# Patient Record
Sex: MALE | Race: WHITE
[De-identification: names, ages, dates, MRNs, and addresses within clinical notes are randomized per-mention and may not be internally consistent; named-entity substitution may affect disease eponyms.]

---

## 2019-03-14 ENCOUNTER — HOSPITAL ENCOUNTER (OUTPATIENT)
Dept: HOSPITAL 39 - LAB.O | Age: 44
End: 2019-03-14
Attending: FAMILY MEDICINE
Payer: COMMERCIAL

## 2019-03-14 DIAGNOSIS — I10: ICD-10-CM

## 2019-03-14 DIAGNOSIS — Z00.00: Primary | ICD-10-CM

## 2019-03-14 DIAGNOSIS — E78.5: ICD-10-CM

## 2019-10-08 NOTE — RAD
EXAM:  XR Right Foot Complete, 3 or More Views



CLINICAL HISTORY:  FOOT PN



TECHNIQUE:  Frontal, lateral and oblique views of the right foot.



COMPARISON:  No relevant prior studies available.



FINDINGS:

  Limitations:  None.

  Bones/joints:  Unremarkable.  No acute fracture.  No

dislocation.

  Soft tissues:  Unremarkable.  

  Other findings:  Intact lateral fibular plate and screws.



IMPRESSION:     

  No acute findings in the right foot.



Electronically signed by:  Yara Lawson MD  10/8/2019 4:31 PM

CDT Workstation: [id number]

## 2020-07-17 NOTE — RAD
EXAM DESCRIPTION: Chest x-ray,1 View



CLINICAL HISTORY: 45 years Male, s/p EGD, epigastric and flank

pain



COMPARISON: Previous chest x-ray May 10, 2009



TECHNIQUE: AP portable chest.



FINDINGS:



Heart size is normal with normal pulmonary vascularity.



Linear discoid atelectasis in left upper lobe with airspace

infiltrate in the lingula and/or left lower lobe consistent with

pneumonia. This is a new finding compared to the previous study.



No pulmonary mass or worrisome nodule.



No pneumothorax or pleural effusion.



Bones are unremarkable.



IMPRESSION:



Patchy infiltrates in the left mid and lower lung zones.



Electronically signed by:  Merlin Garza MD  7/17/2020 2:30

PM CDT Workstation: [id number]

## 2020-07-17 NOTE — HP
SUPERVISING PHYSICIAN:  Jhoan Parmar M.D.



CHIEF COMPLAINT:  Left flank pain.



HISTORY OF PRESENT ILLNESS:  Mr. Johnson is a 45 year-old  male patient 
that has a past medical history of hypertension and gastroesophageal reflux 
disease.  He was actually sent from PACU after he had just had an 
esophagogastroduodenoscopy done complaining of some epigastric and left flank 
pain.  It was reported during the procedure that the patient had some equal of 
gastritis with a biopsy taken without any complications.  After the procedure 
was completed he had the onset of symptoms of gastric pain and left flank pain 
which apparently were not present prior to the procedure.  Initially in the 
Emergency Room he was complaining of pain rating a constant 6/10 radiating up 
into his chest and his back.  There were concerns initially that he may have 
aspirated during the EGD, however the patient endorses he has been having severe
GERD symptoms and waking up in the middle of the night coughing and choking, and
actually throwing up.  His labs initially showed a white count of 5,800 without 
a left shift, but he was showing a fever on admission at 101.  He had a CT of 
the chest in the Emergency Room that showed bilateral pulmonary infiltrates most
significant in the left lower lobe compatible with pneumonia versus aspiration 
and/or atelectasis.  He has been tested for COVID-19 and was positive prior to 
the procedure.  Given his symptomology, other labs were completed, including 
amylase and lipase which did show a slight elevation of amylase at 306 with 
normal lipase.  He does have a history of diabetes.  His glucose was 154.  Liver
functions show just a slightly elevated AST at 57, troponin was less than 0.02. 
EKG in the Emergency Room showed sinus rhythm.  No ST elevation or Q waves or T 
wave inversions.  He was diagnosed with aspiration pneumonitis and some mild 
hypoxia.  On initial presentation, vital signs showed he was satting in PACU and
in the Emergency Room in the high 80s to low 90s on nasal cannula.  He was also 
tested for Strep and was found to be Group A Strep positive.  He was started on 
antibiotics initially with clindamycin and Rocephin, and has been placed in 
Observation for further evaluation and close monitoring.



PAST MEDICAL HISTORY: 

1.   Gastroesophageal reflux disease.

2.   Hypertension.

3.   Diabetes mellitus.

4.   Anxiety.

5.   Obstructive sleep apnea which will be placed on CPAP.



PAST SURGICAL HISTORY:  Ady placed in the right leg.



HOME MEDICATIONS:

1.   Hydrochlorothiazide 25 mg daily.

2.   Flexeril 10 mg p.r.n.

3.   Citalopram 40 mg daily.

4.   Lipitor 10 mg daily.

5.   Metformin extended release 1,000 mg daily.

6.   Scopolamine 1 mg every other day.

7.   Pantoprazole 40 mg daily.

8.   Meloxicam 15 mg daily.

9.   Losartan daily 50 mg.



ALLERGIES:  NO KNOWN DRUG ALLERGIES.



FAMILY HISTORY:  Noncontributory to current admission.



SOCIAL HISTORY:  The patient lives in [place].  He is .  He does drink 
alcohol on a rare occasion and is a nonsmoker.  Does not use any illicit drugs.



REVIEW OF SYSTEMS:  

CONSTITUTIONAL:  Positive for some general malaise and fever, sore throat.

HEENT:  Positive for sore throat.  Denies any headaches, vision changes or ear 
aches.

RESPIRATORY:  Some mild shortness of breath but denies any wheezing, but does 
have some history of coughing at night, especially with GERD.

CHEST:  As noted in history of present illness.

ABDOMEN:  Left flank pain.  Denies any abdominal pains, nausea, vomiting, 
diarrhea or constipation.

GENITOURINARY:  Denies any dysuria, hematuria, polyuria.  

MUSCULOSKELETAL:  Denies any arthralgias, joint swelling.

SKIN:  Denies any lesions, rashes or unexplained changes.

NEUROLOGIC:  Denies any headaches, vision changes, ataxia or seizures.

HEMATOLOGIC:  Denies any unexplained bleeding, bruising or any transfusion 
reactions.



PHYSICAL EXAMINATION: 



VITAL SIGNS:  In the Emergency Room, he had a temperature of 101, heart rate 
105, blood pressure 132/88, satting 86 on room air, 92% on 2 liters nasal 
cannula.  Blood pressure 132/88, respirations 22 to 26.



GENERAL:  The patient did not appear to be in any acute distress.  He is alert, 
resting comfortably.



HEENT: Tympanic membranes clear bilaterally.  Oropharynx is pink with just 
mildly erythematous posterior pharynx.  Tonsils look to be within normal size 
with no noted exudate.



NECK: Supple, nontender with full range of motion.  No jugular venous distention
noted.  



CHEST:  Lung sounds were diminished with some mild crackles heard on the left 
compared to the right but no obvious wheezing or rales.



CARDIOVASCULAR:  Regular rate and rhythm without any appreciable murmurs, 
gallops, or rubs.  



ABDOMEN: Soft, nontender.  Positive bowel sounds.  It was nondistended.



BACK:  He does have some flank tenderness noted to palpation of the lower back 
area to the left rib cage.

 

EXTREMITIES:  Without any edema, clubbing or cyanosis.



NEUROLOGIC: He is alert and oriented times three.  Cranial nerves II-XII are 
grossly intact.  Facial features are symmetrical.  Extraocular movements are 
within normal limits. There is no nystagmus noted.  



SKIN:  Warm, pink and dry.



RADIOLOGY:  Initial chest x-ray in the Emergency Room showed patchy infiltrates 
in the left mid and lower lung zone.  This was followed-up with both a CT of the
abdomen and chest and pelvis with contrast per radiology interpretation on the 
CT of the chest was noted bilateral pulmonary infiltrates most significant in 
the left lung, predominantly left lower lobe compatible with pneumonia versus 
aspiration.  Please see that full report for details.  CT of the abdomen and 
pelvis per radiology interpretation again showed the left lower lobe mostly 
alveolar infiltrate suggestive of pneumonia versus aspiration.  There was note 
of colonic  diverticulosis without any CT evidence of diverticulitis and there 
was some groundglass attenuation in the root of the mesentery with borderline 
enlarged lymph nodes which could represent mesenteric panniculitis.  There are 
no enlarged lymph nodes seen in the abdomen or pelvis.  Some noted hepatomegaly 
and diffuse fatty infiltrate or the liver.  Please see those full reports for 
details.



ASSESSMENT: 

1.   Aspiration pneumonitis with developing pneumonia status post 
esophagogastroduodenoscopy.

2.   Gastroesophageal reflux disease status post esophagogastroduodenoscopy with
finding

      of mild gastritis and duodenitis.

3.   Elevated amylase with left sided pain with the pancreas noted to be 
unremarkable on CT.

      Etiology uncertain, possibly just some mild pancreatitis prior to 
hospitalization, resolving.

4.   Diabetes mellitus on oral therapy.

5.   History of anxiety.

6.   Obstructive sleep apnea utilizing CPAP.



PLAN:  Mr. Johnson is going to be placed in observation, just considering his 
symptomology post procedural EGD with concerns for aspiration pneumonitis versus
developing pneumonia.  He was started on Rocephin and clindamycin on initial 
admission.  Made NPO and then advance to clear liquids in the morning.  Follow 
his labs in the morning.  Reassess with anticipation of length of stay being 1 
to 2 days, possibly discharging later tomorrow on the 18th.  Until we can 
transition to outpatient management, he will be on insulin sliding scale per 
protocol.  He is already on ulcer prophylaxis with Protonix.  Will continue to 
monitor and treat as needed.



#[id number]

[facility]

## 2020-07-17 NOTE — CT
EXAM DESCRIPTION: 

Chest w/Contrast



CLINICAL HISTORY: 

postoperative epigastric pain and Left flank pain



COMPARISON: 

None.



TECHNIQUE: 

Postcontrast CT images of the chest are obtained using standard

imaging protocol. This exam was performed according to our

departmental dose-optimization program, which includes automated

exposure control, adjustment of the mA and/or kV according to

patient size and/or use of iterative reconstruction technique .



FINDINGS: 

Heart is mildly enlarged. No coronary artery calcifications.

Thoracic aorta unremarkable.

No pathologically enlarged mediastinal, hilar, or axillary

lymphadenopathy seen.



No pleural or pericardial effusion.



Images are moderately degraded by breathing motion artifact.



Lungs are mildly hypoaerated. Patchy areas of groundglass opacity

are seen in the lungs bilaterally most pronounced in the left

lung. Alveolar airspace densities with mild interstitial

thickening is seen peripherally throughout the left lower lobe.

Mild areas of interstitial thickening in the mid inferior,

posterior aspect of the left upper lobe.



Osseous structures show no aggressive bony lesions. Mild

spondylitic changes of the spine are seen.



IMPRESSION: 

Bilateral pulmonary infiltrates most significant in the left lung

and predominantly left lower lobe compatible with pneumonia

versus aspiration and/or atelectasis.

Imaging features can be seen with COVID-19 pneumonia, though are

nonspecific and can occur with a variety of infectious and

noninfectious processes.



No pleural effusion or pneumothorax.



Findings in the upper abdomen are noted on CT of the abdomen from

today.





Electronically signed by:  Eleuterio Graves MD  7/17/2020 4:03 PM CDT

Workstation: [id number]

## 2020-07-17 NOTE — CT
EXAM DESCRIPTION: 

Abdomen/Pelvis w/Contrast



CLINICAL HISTORY: 

postoperative epigastric pain and Left flank pain



COMPARISON: 

None.



TECHNIQUE: 

Postcontrast CT images of the abdomen and pelvis are obtained

using standard imaging protocol. This exam was performed

according to our departmental dose-optimization program, which

includes automated exposure control, adjustment of the mA and/or

kV according to patient size and/or use of iterative

reconstruction technique .



FINDINGS: 

Visualized lung bases show multiple alveolar densities and mild

interstitial thickening in the left lower lobe.



Liver is enlarged measuring 23.1 cm with heterogeneous decreased

attenuation. No abnormal enhancing mass.

Spleen, pancreas, adrenal glands, gallbladder are unremarkable.



Mild scattered calcifications of the arterial vasculature.



No nephrolithiasis. Normal cortical enhancement. No ureteral

calcification or obstruction.

Urinary bladder is poorly distended but unremarkable.

Moderate prostate calcifications.



The appendix is normal.



Stomach is poorly distended but unremarkable. No small bowel

obstruction or bowel wall thickening. Moderate scattered

diverticuli of the descending to sigmoid colon without associated

inflammatory changes or fluid collections.



Mild groundglass attenuation in the mesentery with several

borderline enlarged lymph nodes measuring maximum 14 mm short

axis.



Osseous structures show no aggressive bony lesions. No displaced

rib fractures. Spondylitic changes of the spine are seen.



IMPRESSION: 

Left lower lobe mostly alveolar infiltrate suggests pneumonia

versus aspiration. Recommend follow-up until resolution.



Colon diverticulosis without CT evidence of diverticulitis.



Groundglass attenuation in the root of the mesentery with

borderline enlarged lymph nodes could represent mesenteric

paniculitis.. No other enlarged lymph nodes are seen in the

abdomen or pelvis.



Hepatomegaly and diffuse fatty infiltration of the liver is seen.



Other findings as described in body of report.

 



Electronically signed by:  Eleuterio Graves MD  7/17/2020 3:55 PM CDT

Workstation: [id number]

## 2020-07-17 NOTE — OP
DATE OF PROCEDURE:  07/17/20



PREOPERATIVE DIAGNOSIS: 

1.  Epigastric pain.

2.  Reflux.



POSTOPERATIVE DIAGNOSIS: 

1. Gastritis. 



PROCEDURE: 

1.  EGD with biopsy.



SURGEON:  Jhoan Bennett MD



ANESTHESIA: General.



FINDINGS:  Esophagus appeared normal.  The body of the stomach was normal.  
There was no significant hiatal hernia.  There was some gastritis and 
duodenitis.  There was a small polyp a the pylorus that did not look malignant. 
This was biopsied.  Also, biopsies were taken for H. pylori.



PROCEDURE:  The endoscope was passed without difficulty in the esophagus.  We 
entered to the stomach, insufflated and went to the pylorus and second portion 
of the duodenum.  Upon withdrawal, there appeared to be a small amount of 
duodenitis.  No ulcers were seen.  In the antrum, there was watermelon type 
lines consistent with gastritis and a small inflammatory type polyp at the 
pylorus.  Biopsy of this was taken as well as some tissue and then additional 
biopsy sent for H. pylori.  Two small possible healed ulcers in the lesser curve
of the antrum.  Upon retroflexion, the body and fundus appeared normal.  There 
was no evidence of hiatal hernia.  Upon withdrawal of the scope and desufflation
of the stomach, the GE junction appeared normal.  There was significant 
esophagitis or erosions.  The scope was completed.  He tolerated the procedure 
and was taken to Recovery to be discharged.



#[id number]

MTDD

## 2020-07-17 NOTE — ED.PDOC
History of Present Illness





- General


Chief Complaint: Post Op Problems


Stated Complaint: Heartburn, L flank pain, headache post op


Time Seen by Provider: 07/17/20 13:55


Source: patient





- History of Present Illness


Initial Comments: 





45-year-old male with past medical history of hypertension, GERD who is sent 

over from the PACU for chief complaint of epigastric pain and left flank pain, 

which began just after his EGD procedure prior to arrival here.  Patient reports

that he has been having diarrhea for the past 3 to 4 days, 2-3 episodes per day,

initially was green in color but now more watery in nature.  Thus he was 

scheduled for an EGD for evaluation which she had today just prior to arrival.  

Apparently during the procedure patient was only noted to have some evidence of 

gastritis and some biopsies were taken without complication.  





Reports when he woke up after his procedure he had new onset of symptoms of 

epigastric pain and left flank pain which were not present prior to the 

procedure.  He reports the epigastric pain is constant, 6/10, radiates up his 

chest into the back of his throat, burning, no known exacerbating factors, was 

given Pepcid, Maalox, and Zofran 8 mg IV in the PACU without any relief.  

Reports the left flank pain is sharp, constant, 6/10 severity, radiates down the

left lower back and into the left rib cage as well, worse with palpation and 

deep breathing.  Denies any fevers, chills, coughing, urinary symptoms, leg 

swelling, headache, body aches, nausea, vomiting, constipation.  Does report 

sore throat.  No known recent exposure to COVID-19.





Allergies/Adverse Reactions: 


Allergies





NO KNOWN ALLERGY Allergy (Verified 01/26/14 11:07)


   





Home Medications: 


Ambulatory Orders





Ibuprofen 800 mg PO TID PRN #20 tab 08/01/15 


Atorvastatin Calcium [Lipitor] 10 mg PO DAILY 07/17/20 


Citalopram Hydrobromide 40 mg PO DAILY 07/17/20 


Cyclobenzaprine HCl [Flexeril] 10 mg PO DAILY PRN 07/17/20 


Hydrochlorothiazide 25 mg PO DAILY 07/17/20 


Losartan Potassium 50 mg PO DAILY 07/17/20 


Meloxicam 15 mg PO DAILY 07/17/20 


Pantoprazole Sodium 40 mg PO DAILY 07/17/20 


Scopolamine [Transderm-Scop] 1 mg TD PARRIS-OTH-DAY 07/17/20 


metFORMIN XR [Glucophage XR] 1,000 mg PO DAILY 07/17/20 











Review of Systems





- Review of Systems


Review of Systems: 





07/17/20 14:44


As per HPI





All other Systems: Reviewed and Negative





Past Medical History (General)





- Patient Medical History


Hx Seizures: No


Hx Stroke: No


Hx Dementia: No


Hx Asthma: No


Hx of COPD: No


Hx Cardiac Disorders: No


Hx Congestive Heart Failure: No


Hx Pacemaker: No


Hx Hypertension: No


Hx Thyroid Disease: No


Hx Diabetes: Yes - GLUCOSE 118


Hx Gastroesophageal Reflux: Yes


Hx Renal Disease: No


Hx Cancer: No


Hx of HIV: No


Hx Hepatitis C: No


Hx MRSA: No





- Vaccination History


Hx Tetanus, Diphtheria Vaccination: Yes


Hx Influenza Vaccination: No


Hx Pneumococcal Vaccination: No





- Social History


Hx Tobacco Use: Yes


Hx Chewing Tobacco Use: No


Hx Alcohol Use: Yes


Hx Substance Use: No


Hx Substance Use Treatment: No


Hx Depression: No


Hx Physical Abuse: No


Hx Emotional Abuse: No


Hx Suspected Abuse: No





- Female History


Patient Pregnant: No





Family Medical History





- Family History


  ** Mother


Family History: Unknown





Physical Exam





- Physical Exam


General Appearance: Alert, No apparent distress


Eye Exam: bilateral normal


Ears, Nose, Throat: hearing grossly normal, normal ENT inspection, normal 

pharynx


Neck: non-tender, full range of motion, supple, normal inspection


Respiratory: no respiratory distress, no accessory muscle use, other - 

Diminished air movement throughout with bibasilar crackles, worse on the left 

side, no noted wheezing or rales


Cardiovascular/Chest: normal peripheral pulses, regular rate, rhythm, no edema, 

no gallop, no JVD, no murmur


Peripheral Pulses: radial,right: 2+, radial,left: 2+


Gastrointestinal/Abdominal: normal bowel sounds, non tender, soft, no 

organomegaly


Back Exam: normal inspection, other - Back appears normal on inspection.  There 

is moderate left flank tenderness to palpation in left lower back tenderness to 

palpation


Extremity: normal range of motion, non-tender, normal inspection, no pedal 

edema, no calf tenderness, normal capillary refill, pelvis stable


Neurologic: CNs II-XII nml as tested, no motor/sensory deficits, alert, normal 

mood/affect, oriented x 3


Skin Exam: normal color, warm/dry





Progress





- Progress


Progress: 





07/17/20 14:46


Postoperative epigastric pain and left flank pain


-Also with new onset acute hypoxia, resolved with nasal cannula oxygen at 2 L


-Consider aspiration, pneumonia, ACS, PE, gastritis, acute pancreatitis, acute 

cholecystitis, UTI, kidney stone, other infectious etiology, COVID-19, strep, 

gastroenteritis/colitis, other


-Obtain cardiac/abdominal work-up, lactate, urinalysis, respiratory panel, strep

test


-1 L normal saline bolus and Protonix 40 mg IV for pain





07/17/20 16:06


-Patient remained stable, feeling a little better


-Labs revealed normal serum WBC 5800 without left shift or bandemia, lactate 

1.8.  Amylase slightly elevated to 306 but lipase is normal at 34.


-CT scans of the chest, abdomen, and pelvis with IV contrast obtained which 

revealed bilateral pulmonary infiltrates, most significant in the left lower 

lobe of the lung suspicious for aspiration versus pneumonia.  Findings also 

could be representative of COVID-19, which often has nonspecific CT findings.  

No other acute processes were noted.


-Suspect aspiration pneumonitis most likely given his presentation without fever

and without leukocytosis.  However, as cannot rule out pneumonia will begin IV 

antibiotics with Rocephin at this time.  His d-dimer was normal so PE is 

excluded.  Troponin level also was negative.  Patient will need to be admitted 

to the hospital given his acute hypoxia, which I suspect is due to his 

pneumonitis.  We are currently awaiting his rapid COVID-19 test to help 

determine disposition.





07/17/20 16:51


-COVID-19 rapid testing is negative.


-I spoke with Ruth Marcus, hospitalist, who accepts the patient to her service 

for aspiration pneumonitis, possible community-acquired pneumonia, with acute 

hypoxia, and acute gastritis.  The plan will be continued supplemental oxygen 

along with cardiac monitoring and IV antibiotics as well as pain control.





Sherwin Murray MD


Billing #[id number]








                                        





07/17/20 14:05


Sodium Chloride 0.9% (Flush) [Saline Flush Syringe]   10 ml IV PRN PRN 





07/17/20 14:15


EKG STAT 





07/17/20 15:00


RESPIRATORY PANEL 2 Stat 





07/17/20 15:12


Hold Metformin x 48Hrs [id number] 





07/17/20 16:05


cefTRIAXone SODIUM [Rocephin] 1 gm   Sodium Chl 0.9% 50Ml Min-Bag+ [NS 50ml 

MINI-BAG+] 50 ml IVPB ONCE 








                         Laboratory Results - last 24 hr











  07/17/20 07/17/20 07/17/20





  14:10 14:10 14:16


 


WBC    5.8


 


RBC    5.27


 


Hgb    16.2


 


Hct    47.3


 


MCV    89.8


 


MCH    30.8


 


MCHC    34.3


 


RDW    12.7


 


Plt Count    187


 


MPV    8.6


 


Absolute Neuts (auto)    3.90


 


Absolute Lymphs (auto)    1.50


 


Absolute Monos (auto)    0.30


 


Absolute Eos (auto)    0.10


 


Absolute Basos (auto)    0.10


 


Neutrophils %    66.6


 


Lymphocytes %    25.5


 


Monocytes %    5.5


 


Eosinophils %    1.2


 


Basophils %    1.2


 


D-Dimer, Quantitative   292.0 


 


Sodium   


 


Potassium   


 


Chloride   


 


Carbon Dioxide   


 


Anion Gap   


 


BUN   


 


Creatinine   


 


BUN/Creatinine Ratio   


 


Random Glucose   


 


Serum Osmolality   


 


Lactic Acid   


 


Calcium   


 


Total Bilirubin   


 


Direct Bilirubin   


 


Indirect Bilirubin   


 


AST   


 


ALT   


 


Alkaline Phosphatase   


 


Troponin I  < 0.02  


 


Serum Total Protein   


 


Albumin   


 


Amylase   


 


Lipase   


 


Urine Color   


 


Urine Appearance   


 


Urine pH   


 


Ur Specific Gravity   


 


Urine Protein   


 


Urine Glucose (UA)   


 


Urine Ketones   


 


Urine Blood   


 


Urine Nitrite   


 


Urine Bilirubin   


 


Urine Urobilinogen   


 


Ur Leukocyte Esterase   


 


Urine RBC   


 


Urine WBC   


 


Ur Epithelial Cells   


 


Ur Transition Epith Cell   


 


Amorphous Sediment   


 


Urine Bacteria   


 


Urine Mucus   


 


Group A Strep Rapid   














  07/17/20 07/17/20 07/17/20





  14:16 14:16 15:00


 


WBC   


 


RBC   


 


Hgb   


 


Hct   


 


MCV   


 


MCH   


 


MCHC   


 


RDW   


 


Plt Count   


 


MPV   


 


Absolute Neuts (auto)   


 


Absolute Lymphs (auto)   


 


Absolute Monos (auto)   


 


Absolute Eos (auto)   


 


Absolute Basos (auto)   


 


Neutrophils %   


 


Lymphocytes %   


 


Monocytes %   


 


Eosinophils %   


 


Basophils %   


 


D-Dimer, Quantitative   


 


Sodium  139  


 


Potassium  3.9  


 


Chloride  100 L  


 


Carbon Dioxide  29  


 


Anion Gap  13.9  


 


BUN  24 H  


 


Creatinine  1.47 H  


 


BUN/Creatinine Ratio  16.3  


 


Random Glucose  164 H  


 


Serum Osmolality  285.2  


 


Lactic Acid   1.8 


 


Calcium  9.2  


 


Total Bilirubin  0.9  


 


Direct Bilirubin  0.2  


 


Indirect Bilirubin  0.7  


 


AST  57 H  


 


ALT  38  


 


Alkaline Phosphatase  70  


 


Troponin I   


 


Serum Total Protein  7.4  


 


Albumin  4.2  


 


Amylase  306 H*  


 


Lipase  34  


 


Urine Color   


 


Urine Appearance   


 


Urine pH   


 


Ur Specific Gravity   


 


Urine Protein   


 


Urine Glucose (UA)   


 


Urine Ketones   


 


Urine Blood   


 


Urine Nitrite   


 


Urine Bilirubin   


 


Urine Urobilinogen   


 


Ur Leukocyte Esterase   


 


Urine RBC   


 


Urine WBC   


 


Ur Epithelial Cells   


 


Ur Transition Epith Cell   


 


Amorphous Sediment   


 


Urine Bacteria   


 


Urine Mucus   


 


Group A Strep Rapid    Positive














  07/17/20





  15:15


 


WBC 


 


RBC 


 


Hgb 


 


Hct 


 


MCV 


 


MCH 


 


MCHC 


 


RDW 


 


Plt Count 


 


MPV 


 


Absolute Neuts (auto) 


 


Absolute Lymphs (auto) 


 


Absolute Monos (auto) 


 


Absolute Eos (auto) 


 


Absolute Basos (auto) 


 


Neutrophils % 


 


Lymphocytes % 


 


Monocytes % 


 


Eosinophils % 


 


Basophils % 


 


D-Dimer, Quantitative 


 


Sodium 


 


Potassium 


 


Chloride 


 


Carbon Dioxide 


 


Anion Gap 


 


BUN 


 


Creatinine 


 


BUN/Creatinine Ratio 


 


Random Glucose 


 


Serum Osmolality 


 


Lactic Acid 


 


Calcium 


 


Total Bilirubin 


 


Direct Bilirubin 


 


Indirect Bilirubin 


 


AST 


 


ALT 


 


Alkaline Phosphatase 


 


Troponin I 


 


Serum Total Protein 


 


Albumin 


 


Amylase 


 


Lipase 


 


Urine Color  Yellow


 


Urine Appearance  Clear


 


Urine pH  5.5


 


Ur Specific Gravity  1.020


 


Urine Protein  Negative


 


Urine Glucose (UA)  Negative


 


Urine Ketones  Negative


 


Urine Blood  Negative


 


Urine Nitrite  Negative


 


Urine Bilirubin  Negative


 


Urine Urobilinogen  0.2


 


Ur Leukocyte Esterase  Negative


 


Urine RBC  0-1


 


Urine WBC  1-3


 


Ur Epithelial Cells  1-3


 


Ur Transition Epith Cell  0-1


 


Amorphous Sediment  1+


 


Urine Bacteria  Rare


 


Urine Mucus  Trace


 


Group A Strep Rapid 

















- EKG/XRAY/CT


EKG: Sinus - Normal sinus rhythm, heart rate 80, no ST elevations or Q waves 

noted, axis normal, intervals normal, appears largely unchanged from 8/28/2016 

EKG


XRAY: chest - Scattered left-sided infiltrates, worse in the lower lobe, 

concerning for aspiration versus pneumonia per my read





Departure





- Departure


Clinical Impression: 


 Aspiration pneumonitis, Hypoxia





Community acquired pneumonia


Qualifiers:


 Laterality: left Lung location: lower lobe of lung Qualified Code(s): J18.9 - 

Pneumonia, unspecified organism





Gastritis


Qualifiers:


 Gastritis type: unspecified gastritis Chronicity: acute Gastritis bleeding: 

without bleeding Qualified Code(s): K29.00 - Acute gastritis without bleeding





Time of Disposition: 16:50


Disposition: Admit Patient


Condition: Fair


Departure Forms:  ED Discharge - Pt. Copy, Patient Portal Self Enrollment


Referrals: 


Jesus Verdugo MD [Primary Care Provider] - 1-2 Weeks


Home Medications: 


Ambulatory Orders





Ibuprofen 800 mg PO TID PRN #20 tab 08/01/15 


Atorvastatin Calcium [Lipitor] 10 mg PO DAILY 07/17/20 


Citalopram Hydrobromide 40 mg PO DAILY 07/17/20 


Cyclobenzaprine HCl [Flexeril] 10 mg PO DAILY PRN 07/17/20 


Hydrochlorothiazide 25 mg PO DAILY 07/17/20 


Losartan Potassium 50 mg PO DAILY 07/17/20 


Meloxicam 15 mg PO DAILY 07/17/20 


Pantoprazole Sodium 40 mg PO DAILY 07/17/20 


Scopolamine [Transderm-Scop] 1 mg TD PARRIS-OTH-DAY 07/17/20 


metFORMIN XR [Glucophage XR] 1,000 mg PO DAILY 07/17/20 











Decision To Admit





- Decistion To Admit


Decision to Admit Reason: Admit from ER


Decision to Admit Date: 07/17/20


Decision to Admit Time: 16:51

## 2020-07-18 NOTE — RAD
EXAM:Chest,2 Views



CLINICAL INDICATION: Aspiration pneumonia



COMPARISON: 7/17/2020



FINDINGS:Two views of the chest were obtained. The heart size is

normal. The pulmonary vascularity is unremarkable. Streaky

infiltrates are again noted in the left mid to lower lung without

definite change from the previous study. There is no pneumothorax

or pleural effusion.



IMPRESSION: Stable left-sided infiltrates, suspicious for

pneumonia.



Electronically signed by:  Maurice Pinto MD  7/18/2020 8:04 AM CDT

Workstation: [id number]

## 2020-07-19 NOTE — PN
SUPERVISING PHYSICIAN:    Jhoan Parmar MD



DATE:    07/18/20



SUBJECTIVE:  The patient is still having a little but of pain in his left 
ribcage area towards the bottom side.  It is kind of nonspecific, seems like it 
is more of a insidious status pain, a little bit positional at that.  His 
gastroesophageal reflux disease seems to be under control, he has not had 
anymore ingestion, therefore he does show developing pneumonitis, possible 
aspiration, but he is showing to be stable.



OBJECTIVE:

VITAL SIGNS:  Temperature 97.9, pulse 64, blood pressure 120/75, respirations 
15, oxygen saturation 92% on 2 liter nasal cannula.

GENERAL:  The patient is resting comfortably, does not appear to be in any acute
distress.

CHEST:  Lung sounds are actually fairly clear, no obvious wheezing or rales.  He
does have a little bit of crackle continued on the left but nothing on the 
right.

HEART:  Regular rate and rhythm.

ABDOMEN:  Soft, non-tender, positive bowel sounds.

EXTREMITIES:  Without edema.

NEUROLOGIC:   He is alert and oriented x3.



LABORATORY:  Does show a leukocytosis at 11,000 but no current left shift.  
Chemistries show normal electrolytes, BUN 24, creatinine down to 1.44, blood 
sugar remains elevated but controlled between 142 and 157.  Amylase this morning
is down to 212, lipase remains normal.  Liver functions all within normal limits
except for an elevated bilirubin of 1.4.  D-dimer on admission was normal at 292
and again, his group A strep was positive. 



RADIOLOGY:  Chest x-ray this morning per radiology interpretation showed a 
stable left-sided infiltrate suspicious for pneumonia.



ASSESSMENT: 

1.   Aspiration pneumonitis versus pneumonia status post EGD with history 

      of gastroesophageal reflux disease.

2.   Gastroesophageal reflux disease status post EGD again with findings on 

       Dr. Bennett's report for mild gastritis and duodenitis.

3.   Elevated amylase with left sided pain with the pancreas noted to be 
unremarkable on CT.

      Etiology uncertain, possibly just some mild pancreatitis prior to 
hospitalization, resolving.

4.   Diabetes mellitus on oral therapy.

5.   Acute renal insufficiency, likely from recent imaging studies with multiple
studies

      including imaging dye.  Will continue to monitor closely.

5.   History of anxiety.

6.   Obstructive sleep apnea utilizing CPAP.



PLAN:  We will go ahead and continue with antibiotics but given the fact that he
has good kidney function and is not allergic, I am going to go ahead and switch 
him to Unasyn.  We will continue with aggressive pulmonary hygiene.  He said he 
did have a little bit of sputum production which was more greenish in color this
morning.  Again, I would anticipate he will probably go home tomorrow.  We will 
continue to treat and monitor and treat until that point.  



#[id number]

[facility]

## 2020-08-04 NOTE — DS
SUPERVISING  PHYSICIAN:  Jhoan Parmar M.D.



ADMISSION DIAGNOSIS:

1.   Aspiration pneumonitis with developing pneumonia status post 
esophagogastroduodenoscopy.

2.   Gastroesophageal reflux disease status post esophagogastroduodenoscopy with
finding

      of mild gastritis and duodenitis.

3.   Elevated amylase with left sided pain with the pancreas noted to be 
unremarkable on CT.

      Etiology uncertain, possibly just some mild pancreatitis prior to 
hospitalization, resolving.

4.   Diabetes mellitus on oral therapy.

5.   History of anxiety.

6.   Obstructive sleep apnea utilizing CPAP.

7.   Group A Strep pharyngitis.



DISCHARGE DIAGNOSIS: 

1.   Aspiration pneumonitis versus pneumonia status post EGD with history 

      of gastroesophageal reflux disease.

2.   Gastroesophageal reflux disease status post EGD again with findings on 

       Dr. Bennett's report for mild gastritis and duodenitis.

3.   Elevated amylase with left sided pain with the pancreas noted to be 
unremarkable on CT.

      Etiology uncertain, possibly just some mild pancreatitis prior to 
hospitalization, resolving.

4.   Diabetes mellitus on oral therapy.

5.   Acute renal insufficiency, likely from recent imaging studies with multiple
studies

      including imaging dye.  Will continue to monitor closely.

5.   History of anxiety.

6.   Obstructive sleep apnea utilizing CPAP.

7.   Group A Strep pharyngitis.



REASON FOR HOSPITALIZATION:  Mr. Johnson is a 45 year-old  male patient 
that has a past medical history of hypertension and gastroesophageal reflux 
disease.  He was actually sent from PACU after he had just had an 
esophagogastroduodenoscopy done complaining of some epigastric and left flank 
pain.  It was reported during the procedure that the patient had some equal of 
gastritis with a biopsy taken without any complications.  After the procedure 
was completed he had the onset of symptoms of gastric pain and left flank pain 
which apparently were not present prior to the procedure.  Initially in the 
Emergency Room he was complaining of pain rating a constant 6/10 radiating up 
into his chest and his back.  There were concerns initially that he may have 
aspirated during the EGD, however the patient endorses he has been having severe
GERD symptoms and waking up in the middle of the night coughing and choking, and
actually throwing up.  His labs initially showed a white count of 5,800 without 
a left shift, but he was showing a fever on admission at 101.  He had a CT of 
the chest in the Emergency Room that showed bilateral pulmonary infiltrates most
significant in the left lower lobe compatible with pneumonia versus aspiration 
and/or atelectasis.  He has been tested for COVID-19 and was positive prior to 
the procedure.  Given his symptomology, other labs were completed, including 
amylase and lipase which did show a slight elevation of amylase at 306 with 
normal lipase.  He does have a history of diabetes.  His glucose was 154.  Liver
functions show just a slightly elevated AST at 57, troponin was less than 0.02. 
EKG in the Emergency Room showed sinus rhythm.  No ST elevation or Q waves or T 
wave inversions.  He was diagnosed with aspiration pneumonitis and some mild 
hypoxia.  On initial presentation, vital signs showed he was satting in PACU and
in the Emergency Room in the high 80s to low 90s on nasal cannula.  He was also 
tested for Strep and was found to be Group A Strep positive.  He was started on 
antibiotics initially with clindamycin and Rocephin, and has been placed in 
Observation for further evaluation and close monitoring.



LABORATORY STUDIES:  White count on discharge showed to be 7,300, no left shift.
 Coagulation studies showed D-dimer 292.  Chemistry showed normal electrolytes, 
normal creatinine at 1.4.  Blood sugars range between 146 and 202, calcium 8.3. 
Liver functions on admission showed just a slightly elevated bilirubin at 1.4, 
otherwise within normal limits.  Amylase was elevated at 306 initially and was 
showing to return to baseline at 212.  Urinalysis was within normal limits.  He 
had a Group A Strep rapid test that was positive.



MICROBIOLOGY:  Respiratory panel showed not detected for all viral and bacterial
targets.  Please see that report for details.



RADIOLOGY:  Chest x-ray per radiology interpretation initially on admission 
showed patchy infiltrates in the left mid and lower lung zones.  Followup chest 
x-ray prior to discharge on the 18th per radiology interpretation showed left 
sided stable infiltrate suspicious for pneumonia. He also had a CT of the chest 
and per radiology interpretation showed bilateral pulmonary infiltrates most 
significant in the left lung and left lower lobe compatible with pneumonia 
versus aspiration and atelectasis.  He had an abdominal and pelvis CT with 
contrast that showed per radiology interpretation left lower lobe mostly 
alveolar infiltrate suggesting pneumonia versus aspiration.  Colon 
diverticulosis without CT evidence of diverticulitis.  There was note of ground 
glass attenuation in the root of the mesentery with borderline enlarged lymph 
nodes which could represent mesenteric panniculitis.  Hepatomegaly and diffuse 
fatty infiltration of the liver was also seen.  Please see those reports for 
details.



HOSPITAL COURSE:  Mr. Johnson was admitted after he had questionable aspiration 
with some pneumonitis versus pneumonia after he had an EGD.  He was started on 
antibiotics for aspiration pneumonia initially with Rocephin and clindamycin and
then changed to Unasyn.  He was showing good clinical improvement and was 
showing to be stable.  His vital signs showed he was afebrile on day of 
discharge with temperature 98, pulse 88, blood pressure 127/75, respirations 18,
showing 94% on room air compared to admission where he was showing low 80s 
saturations initially prior to admission with 93% on 3 liters nasal cannula.  He
was provided with aggressive pulmonary hygiene and was showing good clinical 
improvement, and felt enough to continue with outpatient management to continue 
oral antibiotics.



PLAN:  Mr. Johnson was discharged to followup with Dr. Verdugo and continue with all 
previous medications.  He was to continue with diet as tolerated as instructed 
by Dr. Bennett.  He was to continue on antibiotics to include Augmentin 875 for 
10 days as well as Guaifenesin and Albuterol.  Condition on discharge was stable
and improved.



DISPOSITION:  The patient is discharged home.  Prescriptions written on 
discharge included:



1.   Augmentin 875, #20.  No refills.

2.   Albuterol inhaler 2 puffs inhaled every 4 hours as needed, 1 inhaler. No 
refills.

3.   Guaifenesin 600 mg every 12 hours, #28.  No refills.



Condition on discharge was stable and improved.



DISPOSITION:  The patient is discharged home.



#[id number]

[facility]